# Patient Record
Sex: MALE | Race: WHITE | NOT HISPANIC OR LATINO | Employment: OTHER | URBAN - METROPOLITAN AREA
[De-identification: names, ages, dates, MRNs, and addresses within clinical notes are randomized per-mention and may not be internally consistent; named-entity substitution may affect disease eponyms.]

---

## 2017-05-10 ENCOUNTER — OUTSIDE FACILITY SERVICE (OUTPATIENT)
Dept: HOSPITALIST | Facility: HOSPITAL | Age: 82
End: 2017-05-10

## 2017-05-10 PROCEDURE — 99307 SBSQ NF CARE SF MDM 10: CPT | Performed by: HOSPITALIST

## 2017-05-24 ENCOUNTER — OUTSIDE FACILITY SERVICE (OUTPATIENT)
Dept: HOSPITALIST | Facility: HOSPITAL | Age: 82
End: 2017-05-24

## 2017-05-24 PROCEDURE — 99304 1ST NF CARE SF/LOW MDM 25: CPT | Performed by: HOSPITALIST

## 2017-06-07 ENCOUNTER — OUTSIDE FACILITY SERVICE (OUTPATIENT)
Dept: HOSPITALIST | Facility: HOSPITAL | Age: 82
End: 2017-06-07

## 2017-06-07 PROCEDURE — 99307 SBSQ NF CARE SF MDM 10: CPT | Performed by: INTERNAL MEDICINE

## 2017-07-08 ENCOUNTER — HOSPITAL ENCOUNTER (EMERGENCY)
Facility: HOSPITAL | Age: 82
Discharge: HOME | End: 2017-07-08
Attending: EMERGENCY MEDICINE | Admitting: EMERGENCY MEDICINE

## 2017-07-08 VITALS
OXYGEN SATURATION: 97 % | HEIGHT: 69 IN | BODY MASS INDEX: 23.85 KG/M2 | HEART RATE: 103 BPM | DIASTOLIC BLOOD PRESSURE: 89 MMHG | TEMPERATURE: 97.9 F | SYSTOLIC BLOOD PRESSURE: 127 MMHG | RESPIRATION RATE: 18 BRPM | WEIGHT: 161 LBS
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
## 2017-07-08 DIAGNOSIS — T07.XXXA MULTIPLE ABRASIONS: ICD-10-CM

## 2017-07-08 PROCEDURE — 90471 IMMUNIZATION ADMIN: CPT

## 2017-07-08 PROCEDURE — 99284 EMERGENCY DEPT VISIT MOD MDM: CPT

## 2017-07-08 PROCEDURE — 99282 EMERGENCY DEPT VISIT SF MDM: CPT | Performed by: EMERGENCY MEDICINE

## 2017-07-08 PROCEDURE — 90714 TD VACC NO PRESV 7 YRS+ IM: CPT

## 2017-07-08 PROCEDURE — 25010000002 TETANUS-DIPHTHERIA TOXOIDS (ADULT) PER 0.5 ML

## 2017-07-08 RX ORDER — BISACODYL 10 MG
10 SUPPOSITORY, RECTAL RECTAL DAILY
COMMUNITY

## 2017-07-08 RX ADMIN — CLOSTRIDIUM TETANI TOXOID ANTIGEN (FORMALDEHYDE INACTIVATED) AND CORYNEBACTERIUM DIPHTHERIAE TOXOID ANTIGEN (FORMALDEHYDE INACTIVATED) 0.5 ML: 5; 2 INJECTION, SUSPENSION INTRAMUSCULAR at 17:34

## 2017-07-08 NOTE — DISCHARGE INSTRUCTIONS
Follow-up with Arnold Chase in 1 week.  Return to emergency department if there is increasing pain, redness, drainage, worse in any way at all.

## 2017-07-08 NOTE — ED NOTES
Wounds on both of pt's legs were cleaned with saline and covered with 4X4's      Negra Aldrich, OSCAR  07/08/17 1812

## 2017-07-08 NOTE — ED PROVIDER NOTES
Subjective   History of Present Illness  History of Present Illness    Chief complaint: Fall    Location: [facility]    Quality/Severity:  No loss of consciousness    Timing/Onset/Duration: Just prior to arrival    Modifying Factors: Nothing makes it better or worse    Associated Symptoms: The patient denies any headache.  No neck or back pain.  No chest pain or shortness of breath.  No abdominal pain.  No hip pain.  No leg pain.  There was no nausea or vomiting.    Narrative: This 89-year-old white male fell transferring from his motorized wheelchair to a stool.  The patient had no loss of consciousness.  The patient has no headache.  The patient is not on blood thinner.  The patient denies any neck or back pain.  There is no chest pain or shortness of breath.  There is no abdominal pain.  There is no loss of control of bladder or bowel function.  The patient does have an abrasion on the left thigh and his legs.  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  He has no other complaints.    PCP: Arnold Chase      Review of Systems   Constitutional: Negative for chills and fever.   HENT: Negative for ear pain and sore throat.    Eyes: Negative for discharge and redness.   Respiratory: Negative for cough, chest tightness and shortness of breath.    Cardiovascular: Negative for chest pain, palpitations and leg swelling.   Gastrointestinal: Negative for abdominal pain, blood in stool, constipation, diarrhea, nausea and vomiting.   Genitourinary: Negative for decreased urine volume, dysuria, flank pain, frequency, hematuria and urgency.   Musculoskeletal: Negative for arthralgias, back pain, neck pain and neck stiffness.   Skin: Positive for wound (multiple abrasions to both legs and to the left thigh).   Neurological: Positive for speech difficulty (chronic, no change) and weakness (generalized). Negative for dizziness, light-headedness, numbness and headaches.   Hematological: Negative for adenopathy.    Psychiatric/Behavioral: Negative.  Negative for agitation and confusion.        Medication List      ASK your doctor about these medications          aspirin 81 MG chewable tablet       BISACODYL LAXATIVE 10 MG suppository   Generic drug:  bisacodyl       CALCIUM + D PO       digoxin 250 MCG tablet   Commonly known as:  LANOXIN       famotidine 20 MG tablet   Commonly known as:  PEPCID       lactulose 10 GM/15ML solution   Commonly known as:  CHRONULAC       polycarbophil 625 MG tablet   Commonly known as:  FIBERCON       pravastatin 40 MG tablet   Commonly known as:  PRAVACHOL           Past Medical History:   Diagnosis Date   • Acute upper respiratory infection    • Allergy    • Atrial fibrillation    • Cervical radiculopathy    • COPD (chronic obstructive pulmonary disease)    • Cough    • Glaucoma    • Herpes zoster    • Hyperlipidemia    • Hypertension    • Infectious mononucleosis    • Nocturia    • Scarlet fever        No Known Allergies    Past Surgical History:   Procedure Laterality Date   • AMPUTATION FINGER / THUMB      WITH NEURECTOMY (EACH)   • BACK SURGERY      SPINE REPAIR   • COLONOSCOPY      Family as well as patient are not wanting any more colonoscopies this was discussed in 2008 at that time his wife was also living and all the family members agree.   • HIP SURGERY Right    • MOUTH SURGERY     • NOSE SURGERY     • TONSILLECTOMY         Family History   Problem Relation Age of Onset   • Hypertension Mother    • Aneurysm Father      AORTIC       Social History     Social History   • Marital status:      Spouse name: N/A   • Number of children: N/A   • Years of education: N/A     Social History Main Topics   • Smoking status: Never Smoker   • Smokeless tobacco: None   • Alcohol use No   • Drug use: Defer   • Sexual activity: Defer     Other Topics Concern   • None     Social History Narrative   • None           Objective   Physical Exam   Constitutional: He is oriented to person, place,  and time. He appears well-developed and well-nourished. No distress.   ED Triage Vitals:  Temp: 97.9 °F (36.6 °C) (07/08/17 1623)  Heart Rate: 102 (07/08/17 1623)  Resp: 18 (07/08/17 1623)  BP: 124/85 (07/08/17 1623)  SpO2: 96 % (07/08/17 1623)  Temp src: Oral (07/08/17 1623)  Heart Rate Source: n/a  Patient Position: Lying (07/08/17 1623)  BP Location: Right arm (07/08/17 1623)  FiO2 (%): n/a    The patient's vitals were reviewed by me.  Unless otherwise noted they are within normal limits.     HENT:   Head: Normocephalic and atraumatic.   Right Ear: External ear normal.   Left Ear: External ear normal.   Nose: Nose normal.   Mouth/Throat: Oropharynx is clear and moist.   Eyes: Conjunctivae and EOM are normal. Pupils are equal, round, and reactive to light. Right eye exhibits no discharge. Left eye exhibits no discharge.   Neck: Normal range of motion. Neck supple. No JVD present. No tracheal deviation present. No thyromegaly present.   There is no tenderness, deformity, or bony step-offs upon palpation the cervical, thoracic, lumbar sacrococcygeal spine.   Cardiovascular: Normal rate, regular rhythm, normal heart sounds and intact distal pulses.  Exam reveals no gallop and no friction rub.    No murmur heard.  Pulmonary/Chest: Effort normal and breath sounds normal. No stridor. No respiratory distress. He has no wheezes. He has no rales. He exhibits no tenderness.   Abdominal: Soft. Bowel sounds are normal. He exhibits no distension and no mass. There is no tenderness. There is no rebound and no guarding. No hernia.   Musculoskeletal: Normal range of motion. He exhibits no edema or deformity.   Abrasions noted to the left thigh and right and left legs.  The patient has sensation to the extremities.  There is no deformity or tenderness.  There is 2+ dorsalis pedis pulses in the right and left feet.  There is 2+ radial pulses in the right and left upper extremity.  The patient has decreased range of motion and  strength secondary to his chronic neurological condition.  There is no tenderness or deformity upon palpation the right or left hip.   Lymphadenopathy:     He has no cervical adenopathy.   Neurological: He is alert and oriented to person, place, and time. He exhibits abnormal muscle tone. Coordination abnormal.   Skin: Skin is warm and dry. No rash noted. He is not diaphoretic. No erythema. No pallor.   Psychiatric: His behavior is normal.   Nursing note and vitals reviewed.      Procedures         ED Course  ED Course    6:37 PM, 07/08/17:  The patient's diagnosis of a fall with multiple abrasions were discussed with the patient and his son.  They agree with the plan to not obtain any x-rays at this time.  The patient will be sent back to the [facility].  The patient should follow-up with Arnold Chase in 1 week.  Return to emergency department if there is increasing pain, or worse in anyway at all.  All of the patient's and son's questions were answered the patient will be discharged in good condition.              MDM  No orders to display     Labs Reviewed - No data to display  No results found.    Final diagnoses:   None         ED Medications:  Medications   tetanus-diphtheria toxoids (DECAVAC 5-2) injection 0.5 mL (0.5 mL Intramuscular Given 7/8/17 0659)       New Medications:     Medication List      ASK your doctor about these medications          aspirin 81 MG chewable tablet       BISACODYL LAXATIVE 10 MG suppository   Generic drug:  bisacodyl       CALCIUM + D PO       digoxin 250 MCG tablet   Commonly known as:  LANOXIN       famotidine 20 MG tablet   Commonly known as:  PEPCID       lactulose 10 GM/15ML solution   Commonly known as:  CHRONULAC       polycarbophil 625 MG tablet   Commonly known as:  FIBERCON       pravastatin 40 MG tablet   Commonly known as:  PRAVACHOL           Stopped Medications:     Medication List      ASK your doctor about these medications          aspirin 81 MG chewable tablet        BISACODYL LAXATIVE 10 MG suppository   Generic drug:  bisacodyl       CALCIUM + D PO       digoxin 250 MCG tablet   Commonly known as:  LANOXIN       famotidine 20 MG tablet   Commonly known as:  PEPCID       lactulose 10 GM/15ML solution   Commonly known as:  CHRONULAC       polycarbophil 625 MG tablet   Commonly known as:  FIBERCON       pravastatin 40 MG tablet   Commonly known as:  PRAVACHOL             Final diagnoses:   Fall, initial encounter   Multiple abrasions            Froy Pacheco MD  07/09/17 0123

## 2017-07-08 NOTE — ED NOTES
Applied bacitracin to bilateral wounds on leg and thigh and dressed with 4X4's secured with kerlix and tape.      Negra Aldrich, OSCAR  07/08/17 9633

## 2017-07-12 ENCOUNTER — OUTSIDE FACILITY SERVICE (OUTPATIENT)
Dept: HOSPITALIST | Facility: HOSPITAL | Age: 82
End: 2017-07-12

## 2017-07-12 PROCEDURE — 99308 SBSQ NF CARE LOW MDM 20: CPT | Performed by: INTERNAL MEDICINE

## 2017-08-09 ENCOUNTER — OUTSIDE FACILITY SERVICE (OUTPATIENT)
Dept: HOSPITALIST | Facility: HOSPITAL | Age: 82
End: 2017-08-09

## 2017-08-09 PROCEDURE — 99307 SBSQ NF CARE SF MDM 10: CPT | Performed by: HOSPITALIST

## 2017-09-06 ENCOUNTER — OUTSIDE FACILITY SERVICE (OUTPATIENT)
Dept: HOSPITALIST | Facility: HOSPITAL | Age: 82
End: 2017-09-06

## 2017-09-06 PROCEDURE — 99307 SBSQ NF CARE SF MDM 10: CPT | Performed by: INTERNAL MEDICINE

## 2017-10-05 ENCOUNTER — OUTSIDE FACILITY SERVICE (OUTPATIENT)
Dept: HOSPITALIST | Facility: HOSPITAL | Age: 82
End: 2017-10-05

## 2017-10-05 PROCEDURE — 99307 SBSQ NF CARE SF MDM 10: CPT | Performed by: HOSPITALIST

## 2017-11-08 ENCOUNTER — OUTSIDE FACILITY SERVICE (OUTPATIENT)
Dept: HOSPITALIST | Facility: HOSPITAL | Age: 82
End: 2017-11-08

## 2017-11-08 PROCEDURE — 99307 SBSQ NF CARE SF MDM 10: CPT | Performed by: INTERNAL MEDICINE

## 2017-12-07 ENCOUNTER — OUTSIDE FACILITY SERVICE (OUTPATIENT)
Dept: HOSPITALIST | Facility: HOSPITAL | Age: 82
End: 2017-12-07

## 2017-12-07 PROCEDURE — 99307 SBSQ NF CARE SF MDM 10: CPT | Performed by: HOSPITALIST

## 2018-01-10 ENCOUNTER — OUTSIDE FACILITY SERVICE (OUTPATIENT)
Dept: HOSPITALIST | Facility: HOSPITAL | Age: 83
End: 2018-01-10

## 2018-01-10 PROCEDURE — 99307 SBSQ NF CARE SF MDM 10: CPT | Performed by: HOSPITALIST

## 2018-02-08 ENCOUNTER — OUTSIDE FACILITY SERVICE (OUTPATIENT)
Dept: HOSPITALIST | Facility: HOSPITAL | Age: 83
End: 2018-02-08

## 2018-02-08 PROCEDURE — 99308 SBSQ NF CARE LOW MDM 20: CPT | Performed by: HOSPITALIST

## 2018-03-15 ENCOUNTER — OUTSIDE FACILITY SERVICE (OUTPATIENT)
Dept: HOSPITALIST | Facility: HOSPITAL | Age: 83
End: 2018-03-15

## 2018-03-15 PROCEDURE — 99307 SBSQ NF CARE SF MDM 10: CPT | Performed by: NURSE PRACTITIONER

## 2018-04-12 ENCOUNTER — OUTSIDE FACILITY SERVICE (OUTPATIENT)
Dept: HOSPITALIST | Facility: HOSPITAL | Age: 83
End: 2018-04-12

## 2018-04-12 PROCEDURE — 99307 SBSQ NF CARE SF MDM 10: CPT | Performed by: NURSE PRACTITIONER

## 2018-04-19 ENCOUNTER — OUTSIDE FACILITY SERVICE (OUTPATIENT)
Dept: HOSPITALIST | Facility: HOSPITAL | Age: 83
End: 2018-04-19

## 2018-04-19 PROCEDURE — 99307 SBSQ NF CARE SF MDM 10: CPT | Performed by: NURSE PRACTITIONER

## 2018-05-17 ENCOUNTER — OUTSIDE FACILITY SERVICE (OUTPATIENT)
Dept: HOSPITALIST | Facility: HOSPITAL | Age: 83
End: 2018-05-17

## 2018-05-17 PROCEDURE — 99318 PR E/M ANNUAL NURSING FACILITY ASSESS STABLE 30 MIN: CPT | Performed by: HOSPITALIST

## 2018-06-14 ENCOUNTER — OUTSIDE FACILITY SERVICE (OUTPATIENT)
Dept: HOSPITALIST | Facility: HOSPITAL | Age: 83
End: 2018-06-14

## 2018-06-14 PROCEDURE — 99307 SBSQ NF CARE SF MDM 10: CPT | Performed by: NURSE PRACTITIONER

## 2018-06-28 ENCOUNTER — APPOINTMENT (OUTPATIENT)
Dept: GENERAL RADIOLOGY | Facility: HOSPITAL | Age: 83
End: 2018-06-28

## 2018-06-28 ENCOUNTER — HOSPITAL ENCOUNTER (EMERGENCY)
Facility: HOSPITAL | Age: 83
Discharge: HOME | End: 2018-06-28
Attending: EMERGENCY MEDICINE | Admitting: EMERGENCY MEDICINE

## 2018-06-28 VITALS
DIASTOLIC BLOOD PRESSURE: 95 MMHG | RESPIRATION RATE: 16 BRPM | HEIGHT: 70 IN | HEART RATE: 98 BPM | OXYGEN SATURATION: 96 % | BODY MASS INDEX: 22.19 KG/M2 | SYSTOLIC BLOOD PRESSURE: 119 MMHG | TEMPERATURE: 98.1 F | WEIGHT: 155 LBS

## 2018-06-28 DIAGNOSIS — M25.551 PAIN OF RIGHT HIP JOINT: Primary | ICD-10-CM

## 2018-06-28 PROCEDURE — 99283 EMERGENCY DEPT VISIT LOW MDM: CPT

## 2018-06-28 PROCEDURE — 71046 X-RAY EXAM CHEST 2 VIEWS: CPT

## 2018-06-28 PROCEDURE — 73502 X-RAY EXAM HIP UNI 2-3 VIEWS: CPT

## 2018-06-28 PROCEDURE — 99282 EMERGENCY DEPT VISIT SF MDM: CPT | Performed by: PHYSICIAN ASSISTANT

## 2018-06-28 RX ORDER — SODIUM CHLORIDE 0.9 % (FLUSH) 0.9 %
10 SYRINGE (ML) INJECTION AS NEEDED
Status: DISCONTINUED | OUTPATIENT
Start: 2018-06-28 | End: 2018-06-29 | Stop reason: HOSPADM

## 2018-06-29 ENCOUNTER — TRANSCRIBE ORDERS (OUTPATIENT)
Dept: ADMINISTRATIVE | Facility: HOSPITAL | Age: 83
End: 2018-06-29

## 2018-06-29 DIAGNOSIS — R52 PAIN: Primary | ICD-10-CM

## 2018-07-01 ENCOUNTER — LAB REQUISITION (OUTPATIENT)
Dept: LAB | Facility: HOSPITAL | Age: 83
End: 2018-07-01

## 2018-07-01 DIAGNOSIS — N39.0 URINARY TRACT INFECTION: ICD-10-CM

## 2018-07-01 LAB
ALBUMIN SERPL-MCNC: 3.7 G/DL (ref 3.5–5.2)
ALBUMIN/GLOB SERPL: 1.2 G/DL
ALP SERPL-CCNC: 106 U/L (ref 40–129)
ALT SERPL W P-5'-P-CCNC: 14 U/L (ref 5–41)
ANION GAP SERPL CALCULATED.3IONS-SCNC: 11.4 MMOL/L
AST SERPL-CCNC: 24 U/L (ref 5–40)
BACTERIA UR QL AUTO: ABNORMAL /HPF
BASOPHILS # BLD AUTO: 0.07 10*3/MM3 (ref 0–0.2)
BASOPHILS NFR BLD AUTO: 0.8 % (ref 0–2)
BILIRUB SERPL-MCNC: 0.5 MG/DL (ref 0.2–1.2)
BILIRUB UR QL STRIP: NEGATIVE
BUN BLD-MCNC: 22 MG/DL (ref 8–23)
BUN/CREAT SERPL: 23.9 (ref 7–25)
CALCIUM SPEC-SCNC: 9.5 MG/DL (ref 8.2–9.6)
CHLORIDE SERPL-SCNC: 101 MMOL/L (ref 98–107)
CLARITY UR: CLEAR
CO2 SERPL-SCNC: 30.6 MMOL/L (ref 22–29)
COLOR UR: YELLOW
CREAT BLD-MCNC: 0.92 MG/DL (ref 0.76–1.27)
DEPRECATED RDW RBC AUTO: 45.4 FL (ref 37–54)
EOSINOPHIL # BLD AUTO: 0.15 10*3/MM3 (ref 0.1–0.3)
EOSINOPHIL NFR BLD AUTO: 1.8 % (ref 0–4)
ERYTHROCYTE [DISTWIDTH] IN BLOOD BY AUTOMATED COUNT: 13.3 % (ref 11.5–14.5)
GFR SERPL CREATININE-BSD FRML MDRD: 77 ML/MIN/1.73
GLOBULIN UR ELPH-MCNC: 3.1 GM/DL
GLUCOSE BLD-MCNC: 113 MG/DL (ref 65–99)
GLUCOSE UR STRIP-MCNC: NEGATIVE MG/DL
HCT VFR BLD AUTO: 42.4 % (ref 42–52)
HGB BLD-MCNC: 14 G/DL (ref 14–18)
HGB UR QL STRIP.AUTO: ABNORMAL
HYALINE CASTS UR QL AUTO: ABNORMAL /LPF
IMM GRANULOCYTES # BLD: 0.02 10*3/MM3 (ref 0–0.03)
IMM GRANULOCYTES NFR BLD: 0.2 % (ref 0–0.5)
KETONES UR QL STRIP: NEGATIVE
LEUKOCYTE ESTERASE UR QL STRIP.AUTO: NEGATIVE
LYMPHOCYTES # BLD AUTO: 1.33 10*3/MM3 (ref 0.6–4.8)
LYMPHOCYTES NFR BLD AUTO: 16 % (ref 20–45)
MCH RBC QN AUTO: 30.8 PG (ref 27–31)
MCHC RBC AUTO-ENTMCNC: 33 G/DL (ref 31–37)
MCV RBC AUTO: 93.4 FL (ref 80–94)
MONOCYTES # BLD AUTO: 0.6 10*3/MM3 (ref 0–1)
MONOCYTES NFR BLD AUTO: 7.2 % (ref 3–8)
NEUTROPHILS # BLD AUTO: 6.13 10*3/MM3 (ref 1.5–8.3)
NEUTROPHILS NFR BLD AUTO: 74 % (ref 45–70)
NITRITE UR QL STRIP: NEGATIVE
NRBC BLD MANUAL-RTO: 0 /100 WBC (ref 0–0)
PH UR STRIP.AUTO: 5.5 [PH] (ref 4.5–8)
PLATELET # BLD AUTO: 239 10*3/MM3 (ref 140–500)
PMV BLD AUTO: 11.1 FL (ref 7.4–10.4)
POTASSIUM BLD-SCNC: 4.6 MMOL/L (ref 3.5–5.2)
PROT SERPL-MCNC: 6.8 G/DL (ref 6–8.5)
PROT UR QL STRIP: ABNORMAL
RBC # BLD AUTO: 4.54 10*6/MM3 (ref 4.7–6.1)
RBC # UR: ABNORMAL /HPF
REF LAB TEST METHOD: ABNORMAL
SODIUM BLD-SCNC: 143 MMOL/L (ref 136–145)
SP GR UR STRIP: 1.03 (ref 1–1.03)
SQUAMOUS #/AREA URNS HPF: ABNORMAL /HPF
UROBILINOGEN UR QL STRIP: ABNORMAL
WBC NRBC COR # BLD: 8.3 10*3/MM3 (ref 4.8–10.8)
WBC UR QL AUTO: ABNORMAL /HPF

## 2018-07-01 PROCEDURE — 81001 URINALYSIS AUTO W/SCOPE: CPT | Performed by: HOSPITALIST

## 2018-07-01 PROCEDURE — 85025 COMPLETE CBC W/AUTO DIFF WBC: CPT | Performed by: HOSPITALIST

## 2018-07-01 PROCEDURE — 87086 URINE CULTURE/COLONY COUNT: CPT | Performed by: HOSPITALIST

## 2018-07-01 PROCEDURE — 80053 COMPREHEN METABOLIC PANEL: CPT | Performed by: HOSPITALIST

## 2018-07-02 LAB — BACTERIA SPEC AEROBE CULT: NO GROWTH

## 2018-07-05 ENCOUNTER — HOSPITAL ENCOUNTER (OUTPATIENT)
Dept: MRI IMAGING | Facility: HOSPITAL | Age: 83
End: 2018-07-05
Attending: HOSPITALIST

## 2018-07-09 ENCOUNTER — OFFICE VISIT (OUTPATIENT)
Dept: ORTHOPEDIC SURGERY | Facility: CLINIC | Age: 83
End: 2018-07-09

## 2018-07-09 VITALS
BODY MASS INDEX: 22.19 KG/M2 | WEIGHT: 155 LBS | HEART RATE: 90 BPM | SYSTOLIC BLOOD PRESSURE: 137 MMHG | HEIGHT: 70 IN | DIASTOLIC BLOOD PRESSURE: 106 MMHG

## 2018-07-09 DIAGNOSIS — M62.3 IMMOBILITY SYNDROME: Primary | ICD-10-CM

## 2018-07-09 DIAGNOSIS — Z96.649 S/P HIP HEMIARTHROPLASTY: ICD-10-CM

## 2018-07-09 DIAGNOSIS — R52 PAIN: ICD-10-CM

## 2018-07-09 PROCEDURE — 99203 OFFICE O/P NEW LOW 30 MIN: CPT | Performed by: ORTHOPAEDIC SURGERY

## 2018-07-09 RX ORDER — DOCUSATE SODIUM 250 MG
250 CAPSULE ORAL DAILY
COMMUNITY

## 2018-07-09 RX ORDER — CIPROFLOXACIN 250 MG/1
250 TABLET, FILM COATED ORAL 2 TIMES DAILY
COMMUNITY
End: 2019-05-28

## 2018-07-09 NOTE — PROGRESS NOTES
Subjective: Right hip pain     Patient ID: Steve Zelaya is a 89 y.o. male.    Chief Complaint:    History of Present Illness 89-year-old nursing home patient presents evaluation of his right hip which according to the caregivers has been hurting for 2 weeks.  All the history is obtained from the caregiver as the patient is unable to give me any details history other than complaining of the right hip hurting.  According to the nursing facility and is been no history of injury but is had pain in the hip for approximately 2 weeks raise unable to bear weight to transfer.  Primary mobility is a wheelchair but had prior been able to do chair to chair and bed to chair transfer on the right hip.  Had a hemiarthroplasty apparently 6,7 or 8 years ago and done well to just recently.  No history of injury other than he did fall last week but after the hip was already hurting.  X-rayed the hip done at the hospital at the end of June and reviewed by me shows a hemiarthroplasty good position but no acute injury noted.       Social History     Occupational History   • Not on file.     Social History Main Topics   • Smoking status: Never Smoker   • Smokeless tobacco: Not on file   • Alcohol use No   • Drug use: Unknown   • Sexual activity: Defer      Review of Systems   Constitutional: Negative for chills, diaphoresis, fever and unexpected weight change.   HENT: Negative for hearing loss, nosebleeds, sore throat and tinnitus.    Eyes: Negative for pain and visual disturbance.   Respiratory: Negative for cough, shortness of breath and wheezing.    Cardiovascular: Negative for chest pain and palpitations.   Gastrointestinal: Negative for abdominal pain, diarrhea, nausea and vomiting.   Endocrine: Negative for cold intolerance, heat intolerance and polydipsia.   Genitourinary: Negative for difficulty urinating, dysuria and hematuria.   Musculoskeletal: Positive for arthralgias. Negative for joint swelling and myalgias.   Skin:  Negative for rash and wound.   Allergic/Immunologic: Negative for environmental allergies.   Neurological: Negative for dizziness, syncope and numbness.   Hematological: Does not bruise/bleed easily.   Psychiatric/Behavioral: Negative for dysphoric mood and sleep disturbance. The patient is not nervous/anxious.          Past Medical History:   Diagnosis Date   • Acute upper respiratory infection    • Allergy    • Atrial fibrillation (CMS/HCC)    • Cervical radiculopathy    • COPD (chronic obstructive pulmonary disease) (CMS/HCC)    • Cough    • Glaucoma    • Herpes zoster    • Hyperlipidemia    • Hypertension    • Infectious mononucleosis    • Nocturia    • Scarlet fever      Past Surgical History:   Procedure Laterality Date   • AMPUTATION FINGER / THUMB      WITH NEURECTOMY (EACH)   • BACK SURGERY      SPINE REPAIR   • COLONOSCOPY      Family as well as patient are not wanting any more colonoscopies this was discussed in 2008 at that time his wife was also living and all the family members agree.   • HIP SURGERY Right    • MOUTH SURGERY     • NOSE SURGERY     • TONSILLECTOMY       Family History   Problem Relation Age of Onset   • Hypertension Mother    • Aneurysm Father         AORTIC         Objective:  Vitals:    07/09/18 0833   BP: (!) 137/106   Pulse: 90     1    07/09/18  0833   Weight: 70.3 kg (155 lb)     Body mass index is 22.24 kg/m².       Ortho Exam  patient is alert but not oriented.  He is able to respond to questions regarding pain but is unable to give him any coherent history.  Again reviewed the x-rays done at the hospital show no acute injury.  His no swelling noted to the right lower leg but does complain of pain localized to the hip area although cannot specifically locate the pain with internal/external rotation and flexion extension of the hip.  Does complain of some tenderness over the greater trochanter.  His calf is nontender and nonswollen.  No complaints of pain with movement of the  left hip.  There is no apparent motor deficit in the right lower extremity and the patient is unable to give any history sensory deficit.  The skin is cool to touch.    Assessment:       1. Immobility syndrome    2. S/P hip hemiarthroplasty    3. Pain          Plan:      over 20 minutes was spent reviewing the patient's x-rays physical findings and going over the history with the caregiver at the nursing facility.  Patient apparently has an MRI scheduled of that right hip.  That MRI scheduled for tomorrow.  If the MRI does not show any acute bony injury but only osteoarthritis involving the acetabulum wasn't which is a possibility after hemiarthroplasty a hip injection under fluoroscopy is an option.  Obviously if there is any type of fracture involving the acetabulum that would alter the treatment recommendations.  We'll call the nursing home with the recommendations after the MRIs been completed.      Work Status:    MAREK query complete.    Orders:  No orders of the defined types were placed in this encounter.      Medications:  No orders of the defined types were placed in this encounter.      Followup:  Return if symptoms worsen or fail to improve.          Dragon transcription disclaimer     Much of this encounter note is an electronic transcription/translation of spoken language to printed text. The electronic translation of spoken language may permit erroneous, or at times, nonsensical words or phrases to be inadvertently transcribed. Although I have reviewed the note for such errors, some may still exist.

## 2018-07-10 ENCOUNTER — HOSPITAL ENCOUNTER (OUTPATIENT)
Dept: MRI IMAGING | Facility: HOSPITAL | Age: 83
Discharge: HOME | End: 2018-07-10
Attending: HOSPITALIST | Admitting: HOSPITALIST

## 2018-07-10 DIAGNOSIS — R52 PAIN: ICD-10-CM

## 2018-07-10 PROCEDURE — 73721 MRI JNT OF LWR EXTRE W/O DYE: CPT

## 2018-07-18 ENCOUNTER — TELEPHONE (OUTPATIENT)
Dept: ORTHOPEDIC SURGERY | Facility: CLINIC | Age: 83
End: 2018-07-18

## 2018-07-18 ENCOUNTER — OFFICE VISIT (OUTPATIENT)
Dept: ORTHOPEDIC SURGERY | Facility: CLINIC | Age: 83
End: 2018-07-18

## 2018-07-18 VITALS — HEIGHT: 70 IN | WEIGHT: 155 LBS | BODY MASS INDEX: 22.19 KG/M2

## 2018-07-18 DIAGNOSIS — M75.121 COMPLETE TEAR OF RIGHT ROTATOR CUFF: Primary | ICD-10-CM

## 2018-07-18 DIAGNOSIS — M19.011 PRIMARY OSTEOARTHRITIS OF RIGHT SHOULDER: ICD-10-CM

## 2018-07-18 PROCEDURE — 99213 OFFICE O/P EST LOW 20 MIN: CPT | Performed by: ORTHOPAEDIC SURGERY

## 2018-07-18 NOTE — TELEPHONE ENCOUNTER
Naz  [phone] with the [facility] calling asking for clarification on Dr. Broussard orders from today's visit:    Per Dr. Castrejon-1) Thick gel foam pad for the wheelchair to address the Right hip pain 2)Right Shoulder pain-osteoarthritis/DJD of the Right shoulder joint-patient declined the steroid injections, Dr. Castrejon did recommend Mobic 7.5mg 1 po qd, but did not prescribe this as he would like to have Dr. Sanford's opinion as she is the treating physician on this patient. (If she is ok with this, then Dr. Castrejon would be happy to send an order over.)    A full dictated office note was also faxed to the facility.    Thanks.

## 2018-07-18 NOTE — PROGRESS NOTES
Subjective: Right shoulder pain, hip pain     Patient ID: Steve Zelaya is a 89 y.o. male.    Chief Complaint:    History of Present Illness patient returns with results of MRI of the hip shows severe muscle atrophy but no bony injuries fractures or significant arthritic changes noted.  Also.  Complaining of right shoulder pain.  The patient's son is with him and according to the patient is been wheelchair-bound for over 20 years.       Social History     Occupational History   • Not on file.     Social History Main Topics   • Smoking status: Never Smoker   • Smokeless tobacco: Never Used   • Alcohol use No   • Drug use: Unknown   • Sexual activity: Defer      Review of Systems   Constitutional: Negative for chills, diaphoresis, fever and unexpected weight change.   HENT: Negative for hearing loss, nosebleeds, sore throat and tinnitus.    Eyes: Negative for pain and visual disturbance.   Respiratory: Negative for cough, shortness of breath and wheezing.    Cardiovascular: Negative for chest pain and palpitations.   Gastrointestinal: Negative for abdominal pain, diarrhea, nausea and vomiting.   Endocrine: Negative for cold intolerance, heat intolerance and polydipsia.   Genitourinary: Negative for difficulty urinating, dysuria and hematuria.   Musculoskeletal: Positive for arthralgias and myalgias. Negative for joint swelling.   Skin: Negative for rash and wound.   Allergic/Immunologic: Negative for environmental allergies.   Neurological: Negative for dizziness, syncope and numbness.   Hematological: Does not bruise/bleed easily.   Psychiatric/Behavioral: Negative for dysphoric mood and sleep disturbance. The patient is not nervous/anxious.          Past Medical History:   Diagnosis Date   • Acute upper respiratory infection    • Allergy    • Atrial fibrillation (CMS/HCC)    • Cervical radiculopathy    • COPD (chronic obstructive pulmonary disease) (CMS/HCC)    • Cough    • Glaucoma    • Herpes zoster    •  Hyperlipidemia    • Hypertension    • Infectious mononucleosis    • Nocturia    • Scarlet fever      Past Surgical History:   Procedure Laterality Date   • AMPUTATION FINGER / THUMB      WITH NEURECTOMY (EACH)   • BACK SURGERY      SPINE REPAIR   • COLONOSCOPY      Family as well as patient are not wanting any more colonoscopies this was discussed in 2008 at that time his wife was also living and all the family members agree.   • HIP SURGERY Right    • MOUTH SURGERY     • NOSE SURGERY     • TONSILLECTOMY       Family History   Problem Relation Age of Onset   • Hypertension Mother    • Aneurysm Father         AORTIC         Objective:  There were no vitals filed for this visit.  1    07/18/18  1402   Weight: 70.3 kg (155 lb)     Body mass index is 22.24 kg/m².       Ortho Exam  reviewed the MRI again shows significant muscle atrophy throughout the pelvis again secondary to his immobility.    There is no fracture or arthritic changes noted.  With regard to the right shoulder pain x-rays done at the outside facility show evidence of chronic rotator cuff tear with glenohumeral and acromioclavicular arthritis.  There is marked pain with any attempted passive range of motion of the shoulder much past about 80°.  His no apparent motor or sensory deficit as best can be determined by the patient.    Assessment:       1. Complete tear of right rotator cuff    2. Primary osteoarthritis of right shoulder    3. Pelvic muscle atrophy          Plan:      over 10 minutes was spent reviewing the patient's MRI findings and x-ray findings and physical findings with the patient's son and the patient himself.  I will see due to the muscle atrophy his pain in the pelvic area due to that atrophy and probably early arthritic changes and may be developing of the is 89  Recommendation and a half for that is a well padded see that might help reduce some of his pain and discomfort.  With regard to the shoulders a chronically torn rotator cuff  with acromioclavicular arthritis.  Acromial cortisone shot might help relieve his pain patient stated through his son that he did not want a cortisone sodium may want to try meloxicam but still okay with the keyhole position to help control the shoulder and some pelvic pain.  Son was in agreement with the treatment plan.  Return to see me when necessary.    Work Status:    MAREK query complete.    Orders:  No orders of the defined types were placed in this encounter.      Medications:  No orders of the defined types were placed in this encounter.      Followup:  Return if symptoms worsen or fail to improve.          Dragon transcription disclaimer     Much of this encounter note is an electronic transcription/translation of spoken language to printed text. The electronic translation of spoken language may permit erroneous, or at times, nonsensical words or phrases to be inadvertently transcribed. Although I have reviewed the note for such errors, some may still exist.

## 2018-07-26 ENCOUNTER — OUTSIDE FACILITY SERVICE (OUTPATIENT)
Dept: HOSPITALIST | Facility: HOSPITAL | Age: 83
End: 2018-07-26

## 2018-07-26 PROCEDURE — 99307 SBSQ NF CARE SF MDM 10: CPT | Performed by: NURSE PRACTITIONER

## 2018-08-15 ENCOUNTER — OUTSIDE FACILITY SERVICE (OUTPATIENT)
Dept: HOSPITALIST | Facility: HOSPITAL | Age: 83
End: 2018-08-15

## 2018-08-15 PROCEDURE — 99307 SBSQ NF CARE SF MDM 10: CPT | Performed by: HOSPITALIST

## 2019-01-31 ENCOUNTER — TRANSCRIBE ORDERS (OUTPATIENT)
Dept: ADMINISTRATIVE | Facility: HOSPITAL | Age: 84
End: 2019-01-31

## 2019-01-31 DIAGNOSIS — R13.10 DYSPHAGIA, UNSPECIFIED TYPE: Primary | ICD-10-CM

## 2019-02-04 ENCOUNTER — HOSPITAL ENCOUNTER (OUTPATIENT)
Dept: GENERAL RADIOLOGY | Facility: HOSPITAL | Age: 84
Discharge: HOME | End: 2019-02-04
Attending: FAMILY MEDICINE | Admitting: FAMILY MEDICINE

## 2019-02-04 DIAGNOSIS — R13.10 DYSPHAGIA, UNSPECIFIED TYPE: Primary | ICD-10-CM

## 2019-02-04 PROCEDURE — 92611 MOTION FLUOROSCOPY/SWALLOW: CPT

## 2019-02-04 PROCEDURE — 74230 X-RAY XM SWLNG FUNCJ C+: CPT

## 2019-02-04 NOTE — MBS/VFSS/FEES
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)   • Herpes zoster   • Hyperlipidemia   • Hypertension   • Hypogonadism in male   • Immobility syndrome   • Prediabetes   • Primary lateral sclerosis (CMS/HCC)   • Decubitus ulcer of sacral region   • Speech impairment   • Paroxysmal atrial fibrillation (CMS/HCC)        Past Medical History:   Diagnosis Date   • Acute upper respiratory infection    • Allergy    • Atrial fibrillation (CMS/HCC)    • Cervical radiculopathy    • COPD (chronic obstructive pulmonary disease) (CMS/HCC)    • Cough    • Glaucoma    • Herpes zoster    • Hyperlipidemia    • Hypertension    • Infectious mononucleosis    • Nocturia    • Scarlet fever         Past Surgical History:   Procedure Laterality Date   • AMPUTATION FINGER / THUMB      WITH NEURECTOMY (EACH)   • BACK SURGERY      SPINE REPAIR   • COLONOSCOPY      Family as well as patient are not wanting any more colonoscopies this was discussed in  at that time his wife was also living and all the family members agree.   • HIP SURGERY Right    • MOUTH SURGERY     • NOSE SURGERY     • TONSILLECTOMY           Visit Dx:     ICD-10-CM ICD-9-CM   1. Dysphagia, unspecified type R13.10 787.20           SLP Adult Swallow Evaluation - 19 1154        Rehab Evaluation    Document Type  evaluation   -AD    Total Evaluation Minutes, SLP  54   -AD    Subjective Information  no complaints   -AD    Patient Observations  alert;cooperative;agree to therapy   -AD    Patient/Family Observations  Pt seen upright in video imaging chair. Accompanied by staff member and son, Steve. Pt requiring positioning in the chair with  significant recline to maintain head in a neutral position. When chair fully upright, pt's head in a forward flexed position.    -AD    Patient Effort  good   -AD    Comment  Pt w/good effort, but difficulty taking trials of po due to positioning and tongue placement    -AD    Symptoms Noted During/After Treatment  none   -AD       General Information    Patient Profile Reviewed  yes   -AD    Pertinent History Of Current Problem  Pt is a 89 y/o male resident of [facility]. 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 Pt currently on a puree diet with honey thick liquids and MBS was ordered to further assess safety of his diet and risk of aspiration. No recent respiratory illness reported by staff, pt or son. Significant weight loss reported in the last few months, greater than 10 pounds.    -AD    Current Method of Nutrition  pureed;honey-thick liquids   -AD    Precautions/Limitations, Vision  WFL;for purposes of eval   -AD    Precautions/Limitations, Hearing  WFL;for purposes of eval   -AD    Prior Level of Function-Communication  motor speech impairment   -AD    Prior Level of Function-Swallowing  puree;honey thick liquids;concerns regarding malnutrition;assistance needed   -AD    Plans/Goals Discussed with  patient and family;agreed upon   -AD    Barriers to Rehab  medically complex;previous functional deficit   -AD    Patient's Goals for Discharge  patient did not state   -AD    Family Goals for Discharge  other (see comments) safe diet   -AD       Pain Assessment    Additional Documentation  -- no specific pain reported or indicated   -AD       Oral Motor and Function    Dentition Assessment  edentulous, does not have dentures   -AD    Secretion Management  anterior loss;problems swallowing secretions;dried secretions in oral cavity;other (see comments) wipes secretions; food in oral cavity removed prior to exam   -AD    Mucosal Quality   moist, healthy   -AD    Gag Response  WFL;other (see comments) may be slightly hypergag   -AD       Oral Musculature and Cranial Nerve Assessment    Oral Motor General Assessment  generalized oral motor weakness;mandibular impairment;oral labial or buccal impairment;lingual impairment;velar impairment;vocal impairment   -AD    Mandibular Impairment Detail, Cranial Nerve V (Trigeminal)  reduced mandibular ROM;reduced strength bilaterally;reduced strength on right;reduced strength on left   -AD    Oral Labial or Buccal Impairment, Detail, Cranial Nerve VII (Facial):  reduced ROM;reduced strength bilaterally   -AD    Lingual Impairment, Detail. Cranial Nerves IX, XII (Glossopharyngeal and Hypoglossal)  reduced lingual ROM;reduced strength;bilaterally;other (see comments) intact gag   -AD    Velar Impairment, Detail, Cranial Nerves X, XI (Vagus and Accessory)  reduced velar strength   -AD    Vocal Impairment, Detail. Cranial Nerve X (Vagus)  vocal quality abnormality (see comments) breathy, weak; severely dysarthric   -AD    Oral Motor, Comment  Pt with weakness of all oral musculature. Able to achieve lip closure around a spoon, but decreased labial strength. Limited lingual range of motion is the most severe. Weak palatal elevation and reduced mandibular strength.    -AD       General Eating/Swallowing Observations    Respiratory Support Currently in Use  room air   -AD    Eating/Swallowing Skills  fed by SLP   -AD    Positioning During Eating  other (see comments) upright with video imaging chair at 45 degrees; head neutral   -AD       MBS/VFSS    Utensils Used  spoon   -AD    Consistencies Trialed  pureed;nectar/syrup-thick liquids;honey-thick liquids 1 nectar, 2 honey, 3 puree   -AD       MBS/VFSS Interpretation    Oral Prep Phase  impaired oral phase of swallowing   -AD    Oral Transit Phase  impaired   -AD    Oral Residue  impaired   -AD    VFSS Summary  Pt presents with a severe oral dysphagia and at least a  mild pharyngeal dysphagia. Difficulty fully assessing the pharyngeal phase due to poor ability to move all trials into the pharynx. Pt with significantly decreased ability to move the bolus posteriorly in the oral cavity. This resulted in significant oral residue. Residue mixed with saliva and noted to thin. Eventually, pt would open his mouth and allow the bolus to fall out of the oral cavity. Pt was reclined to promote gravity assist to propel the bolus backwards. Large bolus placed in the oral cavity and head was also extended backwards to assist with transit. Minimal spill of honey and puree consistencies into the pharynx with delayed triggering of the swallow. Minimal pharyngeal clearance of the bolus noted. Pt with significant material retained in the oral cavity and mild vallecular residue after the swallow. Most material placed in the oral cavity was expelled anteriorly. Oral care provided after the study to completely clear residual material. No penetration or aspiration was viewed during the study. Significant concerns for malnutrition due to increased effort of bolus AP transit with minimal success. Pt also felt to be at significant risk of aspiration due to retained material in the oral cavity and excessive drooling that thinned presented thickened consistencies.    -AD    Oral Phase, Comment  Severe bolus control and formation. Limited AP movement with only small amounts of po making it into the pharynx. Severe drooling and mixing with consistencies with thinning of po trials. Pt with anterior loss and eventually leans his head forward and allows material to come to the front of his mouth and removes it manually.    -AD       Oral Preparatory Phase    Oral Preparatory Phase  anterior loss;prolonged manipulation;inadequate manipulation;spits out food/liquid prior to swallow;bolus removed from mouth manually   -AD    Anterior Loss  nectar-thick liquids;honey-thick liquids;pudding/puree;all consistencies  tested;secondary to reduced lingual strength;secondary to reduced lingual range of motion   -AD    Prolonged Manipulation  nectar-thick liquids;honey-thick liquids;pudding/puree;all consistencies tested;secondary to reduced lingual strength;secondary to reduced lingual range of motion   -AD    Inadequate Manipulation  nectar-thick liquids;honey-thick liquids;pudding/puree;all consistencies tested;secondary to reduced lingual strength;secondary to reduced lingual range of motion   -AD    Spits Out Food/Liquid Prior to Swallow  nectar-thick liquids;honey-thick liquids;pudding/puree;all consistencies tested;secondary to reduced lingual strength;secondary to reduced lingual range of motion   -AD    Bolus Removed from Mouth Manually  nectar-thick liquids;honey-thick liquids;pudding/puree;all consistencies tested;secondary to reduced lingual strength;secondary to reduced lingual range of motion   -AD    Oral Preparatory Phase, Comment  Severe decreased lingual strength, ROM causing decreased bolus formation and control. Poor AP transit within the oral cavity and limited ability to transit material into the pharynx. Pt with anterior loss related to drooling and purposeful attempts to clear the material from the oral cavity. Pt also removed manually when able to get to the front of the mouth. SLP/graduate clinician also manually removed with washcloth and swab.    -AD       Oral Transit Phase    Impaired Oral Transit Phase  unable to initiate bolus transit;delayed initiation of bolus transit;increased A-P transit time;piecemeal oral transit;premature spillage of liquids into pharynx   -AD    Delayed Initiation of bolus transit  nectar-thick liquids;honey-thick liquids;pudding/puree;all consistencies tested;secondary to reduced lingual control;discoordination of lingual movement   -AD    Increased A-P Transit Time  nectar-thick liquids;honey-thick liquids;pudding/puree;all consistencies tested;secondary to reduced lingual  control;discoordination of lingual movement   -AD    Piecemeal Oral Transit  honey-thick liquids;pudding/puree;secondary to reduced lingual control;discoordination of lingual movement   -AD    Premature Spillage of Liquids into Pharynx  honey-thick liquids;pudding/puree;secondary to reduced lingual control;discoordination of lingual movement   -AD    Oral Transit Phase, Comment  Severe oral transit problems with the bulk of the bolus remaining in the oral cavity. He was able to move small amounts posteriorly with use of head extension and gravity to aid with transit. This only allowed for small amounts of honey thick liquids and puree to enter the pharynx. Premature spill to the valleculae occurred prior to triggering the pharyngeal swallow.    -AD       Oral Residue    Impaired Oral Residue  floor of mouth residue;palatal residue;lingual residue;lateral sulci residue;diffuse residue throughout oral cavity   -AD    Floor of Mouth Residue  nectar-thick liquids;honey-thick liquids;pudding/puree;all consistencies tested;secondary to reduced lingual strength;secondary to reduced lingual range of motion   -AD    Palatal Residue  nectar-thick liquids;honey-thick liquids;pudding/puree;all consistencies tested;secondary to reduced lingual strength;secondary to reduced lingual range of motion   -AD    Lingual Residue  nectar-thick liquids;honey-thick liquids;pudding/puree;all consistencies tested;secondary to reduced lingual strength;secondary to reduced lingual range of motion   -AD    Lateral Sulci Residue  nectar-thick liquids;honey-thick liquids;pudding/puree;all consistencies tested;secondary to reduced lingual strength;secondary to reduced lingual range of motion   -AD    Diffuse Residue throughout Oral Cavity  nectar-thick liquids;honey-thick liquids;pudding/puree;all consistencies tested;secondary to reduced lingual strength;secondary to reduced lingual range of motion   -AD    Response to Oral Residue  other (see  comments) partially clears w/flexion and anterior loss.   -AD    Oral Residue, Comment  Pt with difffuse residue on all trials of nectar, honey and puree. Pt with some purposeful anterior loss as well as nonpurposeful anterior loss and would manually wipe as much of the bolus as he could from the anterior tongue surface. The remaining portion on the midtongue, palate, floor of mouth and lateral sulci was manually removed by SLP/Graduate Clinician with use of oral swab and washcloth.    -AD       Initiation of Pharyngeal Swallow    Initiation of Pharyngeal Swallow  bolus in valleculae   -AD    Pharyngeal Phase  impaired pharyngeal phase of swallowing   -AD    Rosenbek's Scale  nectar:;honey:;pudding/puree:;1--->level 1   -AD    Pharyngeal Residue  honey-thick liquids;pudding/puree;base of tongue;valleculae;posterior pharyngeal wall;secondary to reduced base of tongue retraction;secondary to reduced posterior pharyngeal wall stripping   -AD    Response to Residue  unable to clear residue   -AD    Attempted Compensatory Maneuvers  bolus presentation style;multiple swallows;other (see comments) head extension   -AD    Response to Attempted Compensatory Maneuvers  did not reduce residue;other (see comments)   -AD    Pharyngeal Phase, Comment  Pt with limited viewing of the pharyngeal phase due to limited material that was able to be transferred to the pharynx by the patient. He was able to get a small amount of honey thick and puree material into the pharynx. Noted spill to the valleculae prior to the initiation of the swallow resuliting in a delay of the pharyngeal swallow. No penetration or aspiration was viewed. He was able to clear most, but mild vallecular residue and a coating on the posterior pharyngeal wall noted. These were due to decreased base of tongue retraction and decreased posterior pharyngeal stripping wave. Pt was unable to clear vallecular residue. Head extension aided in bolus transit. Attempts to  place a larger bolus in the oral cavity also minimally improved posterior movement into the pharynx.    -AD       Clinical Impression    SLP Swallowing Diagnosis  severe;oral dysfunction;mild;pharyngeal dysfunction At least mild pharyngeal dysfunction; difficult to assess.   -AD    Functional Impact  risk of aspiration/pneumonia;risk of malnutrition;risk of dehydration   -AD    Rehab Potential/Prognosis, Swallowing  other (see comments) fair to poor due to progressive nature of spinocerebellar dz   -AD       Recommendations    Therapy Frequency (Swallow)  evaluation only   -AD    SLP Diet Recommendation  puree;honey thick liquids   -AD    Recommended Precautions and Strategies  upright posture during/after eating head extension to aid in bolus AP transit   -AD    SLP Rec. for Method of Medication Administration  meds crushed;with pudding or applesauce;as tolerated   -AD    Monitor for Signs of Aspiration  yes;cough;elevated WBC count;gurgly voice;throat clearing;notify SLP if any concerns;upper respiratory;pneumonia   -AD    Anticipated Dischage Disposition  skilled nursing facility return to [place]   -      User Key  (r) = Recorded By, (t) = Taken By, (c) = Cosigned By    Initials Name Provider Type    AD Shirley Rosas MS CCC-SLP Speech Therapist            OP SLP Education     Row Name 02/04/19 0079       Education    Barriers to Learning  No barriers identified  -AD    Education Provided  Patient expressed understanding of evaluation;Family/caregivers expressed understanding of evaluation Family demonstrated understanding of recommendations  -AD    Assessed  Learning needs;Learning motivation;Learning preferences;Learning readiness  -AD    Learning Motivation  Strong  -AD    Learning Method  Explanation;Demonstration  -AD    Teaching Response  Verbalized understanding  -AD    Education Comments  SonSteve, demonstrated understanding of the results, risks and recommendations following the evaluation.    -AD      User Key  (r) = Recorded By, (t) = Taken By, (c) = Cosigned By    Initials Name Effective Dates    Shirley Lyon MS CCC-SLP 06/22/16 -             OP SLP Assessment/Plan - 02/04/19 1154        SLP Assessment    Functional Problems  Swallowing   -AD    Impact on Function: Swallowing  Risk of malnourishment;Risk of dehydration;Risk of aspiration;Risk of pneumonia;Impact on social aspects of eating   -AD    Clinical Impression: Swallowing  Severe:;oral phase dysphagia;Mild:;pharyngeal phase dysphagia   -AD    Functional Problems Comment  Significant risk of malnutrition and dehydration due to severity of oral transit. Pt also with increased effort to take po demonstrating increased need for caloric intake to maintain his weight. Risk of aspiration also a consideration even though not viewed on this study. Pt with significant drooling and thinning of material presented. He does demonstrate a good gag response which demnostrates increased sensory awareness in the pharynx.    -AD    Clinical Impression Comments  Limited MBS study due to limitation in moving the bolus into the pharynx for further evaluation of this phase of swallowing and aspiration risks. He may benefit from a re assessment via FEES or MBS with his regular caregivers feeding him in his routine position and method of feeding.    -AD    SLP Diagnosis  Severe oral phase and at least mild pharyngeal phase dysphagia   -AD    Prognosis  Fair (comment) at best due to progressive nature of spinocerebellar degener    at best due to progressive nature of spinocerebellar degener  -AD       SLP Plan    Frequency  evaluation only   -AD      User Key  (r) = Recorded By, (t) = Taken By, (c) = Cosigned By    Initials Name Provider Type    Shirley Lyon MS CCC-SLP Speech Therapist           Time Calculation:   SLP Start Time: 1100  SLP Stop Time: 1154  SLP Time Calculation (min): 54 min  SLP Non-Billable Time (min): 0 min  Total Timed Code  Minutes- SLP: 0 minute(s)    Therapy Charges for Today     Code Description Service Date Service Provider Modifiers Qty    [id number] HC ST MOTION FLUORO EVAL SWALLOW 4 2/4/2019 Shirley Rosas, MS CCC-SLP GN 1        Shirley Rosas, MS CCC-SLP  2/4/2019

## 2019-05-28 ENCOUNTER — APPOINTMENT (OUTPATIENT)
Dept: CT IMAGING | Facility: HOSPITAL | Age: 84
End: 2019-05-28

## 2019-05-28 ENCOUNTER — HOSPITAL ENCOUNTER (EMERGENCY)
Facility: HOSPITAL | Age: 84
Discharge: NURSING, REHAB OR LONG-TERM CARE | End: 2019-05-28
Attending: EMERGENCY MEDICINE | Admitting: EMERGENCY MEDICINE

## 2019-05-28 ENCOUNTER — APPOINTMENT (OUTPATIENT)
Dept: GENERAL RADIOLOGY | Facility: HOSPITAL | Age: 84
End: 2019-05-28

## 2019-05-28 VITALS
BODY MASS INDEX: 17.32 KG/M2 | HEIGHT: 74 IN | WEIGHT: 135 LBS | TEMPERATURE: 97.6 F | HEART RATE: 96 BPM | RESPIRATION RATE: 14 BRPM | DIASTOLIC BLOOD PRESSURE: 82 MMHG | OXYGEN SATURATION: 97 % | SYSTOLIC BLOOD PRESSURE: 121 MMHG

## 2019-05-28 DIAGNOSIS — I63.9 LEFT-SIDED CEREBROVASCULAR ACCIDENT (CVA) (HCC): Primary | ICD-10-CM

## 2019-05-28 DIAGNOSIS — E87.0 HYPERNATREMIA: ICD-10-CM

## 2019-05-28 LAB
ALBUMIN SERPL-MCNC: 3.6 G/DL (ref 3.5–5.2)
ALBUMIN/GLOB SERPL: 1.1 G/DL
ALP SERPL-CCNC: 106 U/L (ref 39–117)
ALT SERPL W P-5'-P-CCNC: 17 U/L (ref 1–41)
AMORPH URATE CRY URNS QL MICRO: ABNORMAL /HPF
ANION GAP SERPL CALCULATED.3IONS-SCNC: 16.9 MMOL/L
APTT PPP: 27.1 SECONDS (ref 24.3–38.1)
AST SERPL-CCNC: 25 U/L (ref 1–40)
BACTERIA UR QL AUTO: ABNORMAL /HPF
BASOPHILS # BLD AUTO: 0.04 10*3/MM3 (ref 0–0.2)
BASOPHILS NFR BLD AUTO: 0.6 % (ref 0–1.5)
BILIRUB SERPL-MCNC: 0.9 MG/DL (ref 0.2–1.2)
BILIRUB UR QL STRIP: NEGATIVE
BUN BLD-MCNC: 30 MG/DL (ref 8–23)
BUN/CREAT SERPL: 26.3 (ref 7–25)
CALCIUM SPEC-SCNC: 9.4 MG/DL (ref 8.2–9.6)
CHLORIDE SERPL-SCNC: 113 MMOL/L (ref 98–107)
CLARITY UR: ABNORMAL
CO2 SERPL-SCNC: 23.1 MMOL/L (ref 22–29)
COLOR UR: YELLOW
CREAT BLD-MCNC: 1.14 MG/DL (ref 0.76–1.27)
D-LACTATE SERPL-SCNC: 1.9 MMOL/L (ref 0.5–2)
DEPRECATED RDW RBC AUTO: 57.4 FL (ref 37–54)
DIGOXIN SERPL-MCNC: 0.32 NG/ML (ref 0.6–1.2)
EOSINOPHIL # BLD AUTO: 0.09 10*3/MM3 (ref 0–0.4)
EOSINOPHIL NFR BLD AUTO: 1.3 % (ref 0.3–6.2)
ERYTHROCYTE [DISTWIDTH] IN BLOOD BY AUTOMATED COUNT: 17.2 % (ref 12.3–15.4)
FLUAV AG NPH QL: NEGATIVE
FLUBV AG NPH QL IA: NEGATIVE
GFR SERPL CREATININE-BSD FRML MDRD: 60 ML/MIN/1.73
GLOBULIN UR ELPH-MCNC: 3.2 GM/DL
GLUCOSE BLD-MCNC: 82 MG/DL (ref 65–99)
GLUCOSE UR STRIP-MCNC: NEGATIVE MG/DL
HCT VFR BLD AUTO: 47.8 % (ref 37.5–51)
HGB BLD-MCNC: 15.3 G/DL (ref 13–17.7)
HGB UR QL STRIP.AUTO: ABNORMAL
HYALINE CASTS UR QL AUTO: ABNORMAL /LPF
IMM GRANULOCYTES # BLD AUTO: 0.03 10*3/MM3 (ref 0–0.05)
IMM GRANULOCYTES NFR BLD AUTO: 0.4 % (ref 0–0.5)
INR PPP: 1.18 (ref 0.9–1.1)
KETONES UR QL STRIP: ABNORMAL
LEUKOCYTE ESTERASE UR QL STRIP.AUTO: NEGATIVE
LYMPHOCYTES # BLD AUTO: 1.39 10*3/MM3 (ref 0.7–3.1)
LYMPHOCYTES NFR BLD AUTO: 20.6 % (ref 19.6–45.3)
MCH RBC QN AUTO: 29.3 PG (ref 26.6–33)
MCHC RBC AUTO-ENTMCNC: 32 G/DL (ref 31.5–35.7)
MCV RBC AUTO: 91.6 FL (ref 79–97)
MONOCYTES # BLD AUTO: 0.49 10*3/MM3 (ref 0.1–0.9)
MONOCYTES NFR BLD AUTO: 7.3 % (ref 5–12)
NEUTROPHILS # BLD AUTO: 4.7 10*3/MM3 (ref 1.7–7)
NEUTROPHILS NFR BLD AUTO: 69.8 % (ref 42.7–76)
NITRITE UR QL STRIP: NEGATIVE
NRBC BLD AUTO-RTO: 0 /100 WBC (ref 0–0.2)
PH UR STRIP.AUTO: <=5 [PH] (ref 4.5–8)
PLATELET # BLD AUTO: 156 10*3/MM3 (ref 140–450)
PMV BLD AUTO: 10.9 FL (ref 6–12)
POTASSIUM BLD-SCNC: 4.6 MMOL/L (ref 3.5–5.2)
PROCALCITONIN SERPL-MCNC: 0.07 NG/ML (ref 0.1–0.25)
PROT SERPL-MCNC: 6.8 G/DL (ref 6–8.5)
PROT UR QL STRIP: ABNORMAL
PROTHROMBIN TIME: 14.7 SECONDS (ref 12.1–15)
RBC # BLD AUTO: 5.22 10*6/MM3 (ref 4.14–5.8)
RBC # UR: ABNORMAL /HPF
REF LAB TEST METHOD: ABNORMAL
SODIUM BLD-SCNC: 153 MMOL/L (ref 136–145)
SP GR UR STRIP: 1.02 (ref 1–1.03)
SQUAMOUS #/AREA URNS HPF: ABNORMAL /HPF
UROBILINOGEN UR QL STRIP: ABNORMAL
WBC NRBC COR # BLD: 6.74 10*3/MM3 (ref 3.4–10.8)
WBC UR QL AUTO: ABNORMAL /HPF

## 2019-05-28 PROCEDURE — 83605 ASSAY OF LACTIC ACID: CPT | Performed by: EMERGENCY MEDICINE

## 2019-05-28 PROCEDURE — 99285 EMERGENCY DEPT VISIT HI MDM: CPT

## 2019-05-28 PROCEDURE — 80162 ASSAY OF DIGOXIN TOTAL: CPT | Performed by: EMERGENCY MEDICINE

## 2019-05-28 PROCEDURE — 85025 COMPLETE CBC W/AUTO DIFF WBC: CPT | Performed by: EMERGENCY MEDICINE

## 2019-05-28 PROCEDURE — P9612 CATHETERIZE FOR URINE SPEC: HCPCS

## 2019-05-28 PROCEDURE — 81001 URINALYSIS AUTO W/SCOPE: CPT | Performed by: EMERGENCY MEDICINE

## 2019-05-28 PROCEDURE — 71045 X-RAY EXAM CHEST 1 VIEW: CPT

## 2019-05-28 PROCEDURE — 80053 COMPREHEN METABOLIC PANEL: CPT | Performed by: EMERGENCY MEDICINE

## 2019-05-28 PROCEDURE — 84145 PROCALCITONIN (PCT): CPT | Performed by: EMERGENCY MEDICINE

## 2019-05-28 PROCEDURE — 96360 HYDRATION IV INFUSION INIT: CPT

## 2019-05-28 PROCEDURE — 99285 EMERGENCY DEPT VISIT HI MDM: CPT | Performed by: EMERGENCY MEDICINE

## 2019-05-28 PROCEDURE — 85730 THROMBOPLASTIN TIME PARTIAL: CPT | Performed by: EMERGENCY MEDICINE

## 2019-05-28 PROCEDURE — 87804 INFLUENZA ASSAY W/OPTIC: CPT | Performed by: EMERGENCY MEDICINE

## 2019-05-28 PROCEDURE — 87040 BLOOD CULTURE FOR BACTERIA: CPT | Performed by: EMERGENCY MEDICINE

## 2019-05-28 PROCEDURE — 70450 CT HEAD/BRAIN W/O DYE: CPT

## 2019-05-28 PROCEDURE — 85610 PROTHROMBIN TIME: CPT | Performed by: EMERGENCY MEDICINE

## 2019-05-28 RX ORDER — SODIUM CHLORIDE 0.9 % (FLUSH) 0.9 %
10 SYRINGE (ML) INJECTION AS NEEDED
Status: DISCONTINUED | OUTPATIENT
Start: 2019-05-28 | End: 2019-05-28 | Stop reason: HOSPADM

## 2019-05-28 RX ORDER — SODIUM CHLORIDE 450 MG/100ML
INJECTION, SOLUTION INTRAVENOUS
Status: DISCONTINUED
Start: 2019-05-28 | End: 2019-05-28 | Stop reason: HOSPADM

## 2019-05-28 RX ORDER — SODIUM CHLORIDE 450 MG/100ML
1000 INJECTION, SOLUTION INTRAVENOUS ONCE
Status: COMPLETED | OUTPATIENT
Start: 2019-05-28 | End: 2019-05-28

## 2019-05-28 RX ADMIN — SODIUM CHLORIDE 1000 ML/HR: 4.5 INJECTION, SOLUTION INTRAVENOUS at 03:14

## 2019-06-02 LAB
BACTERIA SPEC AEROBE CULT: NORMAL
BACTERIA SPEC AEROBE CULT: NORMAL